# Patient Record
Sex: FEMALE | Race: WHITE | NOT HISPANIC OR LATINO | Employment: UNEMPLOYED | URBAN - METROPOLITAN AREA
[De-identification: names, ages, dates, MRNs, and addresses within clinical notes are randomized per-mention and may not be internally consistent; named-entity substitution may affect disease eponyms.]

---

## 2018-01-07 ENCOUNTER — OFFICE VISIT (OUTPATIENT)
Dept: URGENT CARE | Facility: PHYSICIAN GROUP | Age: 48
End: 2018-01-07
Payer: COMMERCIAL

## 2018-01-07 ENCOUNTER — HOSPITAL ENCOUNTER (OUTPATIENT)
Dept: RADIOLOGY | Facility: MEDICAL CENTER | Age: 48
End: 2018-01-07
Attending: NURSE PRACTITIONER
Payer: COMMERCIAL

## 2018-01-07 VITALS
BODY MASS INDEX: 28.35 KG/M2 | TEMPERATURE: 98.6 F | WEIGHT: 160 LBS | RESPIRATION RATE: 14 BRPM | SYSTOLIC BLOOD PRESSURE: 132 MMHG | DIASTOLIC BLOOD PRESSURE: 84 MMHG | HEIGHT: 63 IN | HEART RATE: 88 BPM | OXYGEN SATURATION: 94 %

## 2018-01-07 DIAGNOSIS — S92.102A CLOSED DISPLACED FRACTURE OF LEFT TALUS, UNSPECIFIED PORTION OF TALUS, INITIAL ENCOUNTER: ICD-10-CM
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
## 2018-01-07 DIAGNOSIS — M79.672 ACUTE PAIN OF LEFT FOOT: ICD-10-CM

## 2018-01-07 DIAGNOSIS — S16.1XXA ACUTE CERVICAL MYOFASCIAL STRAIN, INITIAL ENCOUNTER: ICD-10-CM

## 2018-01-07 DIAGNOSIS — S99.912A INJURY OF LEFT ANKLE, INITIAL ENCOUNTER: ICD-10-CM

## 2018-01-07 DIAGNOSIS — M54.2 ACUTE NECK PAIN: ICD-10-CM

## 2018-01-07 PROCEDURE — 99214 OFFICE O/P EST MOD 30 MIN: CPT | Performed by: NURSE PRACTITIONER

## 2018-01-07 PROCEDURE — 73630 X-RAY EXAM OF FOOT: CPT | Mod: LT

## 2018-01-07 PROCEDURE — 73610 X-RAY EXAM OF ANKLE: CPT | Mod: LT

## 2018-01-07 PROCEDURE — 72040 X-RAY EXAM NECK SPINE 2-3 VW: CPT

## 2018-01-07 ASSESSMENT — ENCOUNTER SYMPTOMS
FOCAL WEAKNESS: 0
SENSORY CHANGE: 0
BACK PAIN: 0
GASTROINTESTINAL NEGATIVE: 1
HEADACHES: 0
CARDIOVASCULAR NEGATIVE: 1
MYALGIAS: 0
NECK PAIN: 1
NEUROLOGICAL NEGATIVE: 1
TINGLING: 0
CONSTITUTIONAL NEGATIVE: 1
PSYCHIATRIC NEGATIVE: 1
FALLS: 1
LOSS OF CONSCIOUSNESS: 0
DIZZINESS: 0
RESPIRATORY NEGATIVE: 1
EYES NEGATIVE: 1

## 2018-01-07 ASSESSMENT — PAIN SCALES - GENERAL: PAINLEVEL: 4=SLIGHT-MODERATE PAIN

## 2018-01-07 NOTE — LETTER
January 7, 2018         Patient: Emilie Lundy   YOB: 1970   Date of Visit: 1/7/2018           To Whom it May Concern:    Emilie Lundy was seen in my clinic on 1/7/2018. She may be excused from work for her next scheduled shifts until evaluated by orthopedics.     If you have any questions or concerns, please don't hesitate to call.        Sincerely,           JUNIOR Olson.  Electronically Signed

## 2018-01-07 NOTE — PROGRESS NOTES
"Subjective:      Emilie Lundy is a 47 y.o. female who presents with Fall (Left foot injury, Poss neck injury, x 1 day)        HPI    The patient presents to urgent care today with complaints of left foot pain and right upper back pain after an accidental fall this morning. States she was walking a dog down a ramp when she accidentally slipped and fell, falling onto her back side. Since then she has had pain to her left foot and right upper back. She denies numbness, tingling, unilateral weakness. She has not tried any medication for symptom relief. She has been using crutches for her foot pain relief. Denies previous injuries to this area.    History reviewed. No pertinent past medical history.  History reviewed. No pertinent surgical history.  Current Outpatient Prescriptions on File Prior to Visit   Medication Sig Dispense Refill   • ibuprofen (MOTRIN) 200 MG Tab Take 200 mg by mouth every 6 hours as needed.       No current facility-administered medications on file prior to visit.      Patient has no known allergies.    Review of Systems   Constitutional: Negative.    HENT: Negative.    Eyes: Negative.    Respiratory: Negative.    Cardiovascular: Negative.    Gastrointestinal: Negative.    Genitourinary: Negative.    Musculoskeletal: Positive for falls, joint pain and neck pain. Negative for back pain and myalgias.   Skin: Negative.    Neurological: Negative.  Negative for dizziness, tingling, sensory change, focal weakness, loss of consciousness and headaches.   Endo/Heme/Allergies: Negative.    Psychiatric/Behavioral: Negative.           Objective:     /84   Pulse 88   Temp 37 °C (98.6 °F)   Resp 14   Ht 1.6 m (5' 3\")   Wt 72.6 kg (160 lb)   LMP 10/07/2017   SpO2 94%   Breastfeeding? No   BMI 28.34 kg/m²      Physical Exam   Constitutional: She is oriented to person, place, and time. Vital signs are normal. She appears well-developed and well-nourished. She is active. She does not have a sickly " appearance. She does not appear ill. No distress.   HENT:   Head: Normocephalic and atraumatic.   Right Ear: External ear normal.   Left Ear: External ear normal.   Nose: Nose normal.   Mouth/Throat: Oropharynx is clear and moist. No oropharyngeal exudate.   Eyes: Conjunctivae are normal. Pupils are equal, round, and reactive to light. Right eye exhibits no discharge. Left eye exhibits no discharge. No scleral icterus.   Neck: Normal range of motion. No JVD present. No tracheal deviation present.   Cardiovascular: Normal rate, regular rhythm, normal heart sounds and intact distal pulses.    Pulmonary/Chest: Effort normal and breath sounds normal. No stridor. No respiratory distress. She exhibits no tenderness.   Musculoskeletal: She exhibits tenderness. She exhibits no edema or deformity.        Left ankle: She exhibits decreased range of motion and swelling. Tenderness. AITFL and CF ligament tenderness found. Achilles tendon normal.        Cervical back: She exhibits tenderness, pain and spasm. She exhibits normal range of motion, no bony tenderness, no swelling, no edema, no deformity, no laceration and normal pulse.        Thoracic back: Normal.        Lumbar back: Normal.        Back:         Left lower leg: Normal.        Left foot: There is decreased range of motion, tenderness, bony tenderness and swelling. There is normal capillary refill, no crepitus, no deformity and no laceration.        Feet:    Lymphadenopathy:     She has no cervical adenopathy.   Neurological: She is alert and oriented to person, place, and time. She has normal strength. No cranial nerve deficit or sensory deficit. She exhibits normal muscle tone. Coordination normal. GCS eye subscore is 4. GCS verbal subscore is 5. GCS motor subscore is 6.   Skin: Skin is warm, dry and intact. Capillary refill takes less than 2 seconds. No abrasion, no bruising, no ecchymosis, no laceration and no rash noted. She is not diaphoretic. No erythema. No  "pallor.   Psychiatric: She has a normal mood and affect. Her behavior is normal. Judgment and thought content normal.   Vitals reviewed.              Assessment/Plan:     1. Fall, initial encounter  DX-FOOT-COMPLETE 3+ LEFT    DX-CERVICAL SPINE-2 OR 3 VIEWS    DX-ANKLE 3+ VIEWS LEFT    REFERRAL TO ORTHOPEDICS   2. Closed displaced fracture of left talus, unspecified portion of talus, initial encounter  DX-ANKLE 3+ VIEWS LEFT    REFERRAL TO ORTHOPEDICS   3. Acute cervical myofascial strain, initial encounter  DX-CERVICAL SPINE-2 OR 3 VIEWS   4. Acute pain of left foot  DX-FOOT-COMPLETE 3+ LEFT    REFERRAL TO ORTHOPEDICS       - DX-FOOT-COMPLETE 3+ LEFT reviewed by myself, radiology reading \"No acute left foot fracture or dislocation.\"    - DX-CERVICAL SPINE-2 OR 3 VIEWS reviewed by myself, radiology reading \"1.  No acute cervical spine fracture or subluxation. 2.  Minor degenerative change with posterior osteophytic spurring at the C5-6 level.\"    -DX-ANKLE 3+ LEFT reviewed by myself, radiology reading \"Avulsion fracture of the dorsal aspect of the distal talus.\"      Cervical x-ray negative, suspect strain. Left foot x-rays negative. Left ankle x-ray shows avulsion fracture of talus. Referral to orthopedics placed. The patient is placed in a walking boot, instructed to be nonweightbearing with crutches. RICE. OTC NSAIDS.  Supportive care, differential diagnoses, and indications for immediate follow-up discussed with patient.   Pathogenesis of diagnosis discussed including typical length and natural progression.   Instructed to return to clinic or nearest emergency department for any change in condition, further concerns, or worsening of symptoms.  Patient states understanding of the plan of care and discharge instructions.          JUNIOR Olson.          "

## 2018-01-09 ENCOUNTER — HOSPITAL ENCOUNTER (OUTPATIENT)
Dept: RADIOLOGY | Facility: MEDICAL CENTER | Age: 48
End: 2018-01-09
Attending: PHYSICIAN ASSISTANT
Payer: COMMERCIAL

## 2018-01-09 DIAGNOSIS — M25.572 LEFT ANKLE PAIN, UNSPECIFIED CHRONICITY: ICD-10-CM

## 2018-01-09 PROCEDURE — 73700 CT LOWER EXTREMITY W/O DYE: CPT | Mod: LT

## 2018-09-06 ENCOUNTER — APPOINTMENT (RX ONLY)
Dept: URBAN - METROPOLITAN AREA CLINIC 4 | Facility: CLINIC | Age: 48
Setting detail: DERMATOLOGY
End: 2018-09-06

## 2018-09-06 DIAGNOSIS — L81.4 OTHER MELANIN HYPERPIGMENTATION: ICD-10-CM

## 2018-09-06 DIAGNOSIS — Z71.89 OTHER SPECIFIED COUNSELING: ICD-10-CM

## 2018-09-06 DIAGNOSIS — L20.89 OTHER ATOPIC DERMATITIS: ICD-10-CM

## 2018-09-06 DIAGNOSIS — D22 MELANOCYTIC NEVI: ICD-10-CM

## 2018-09-06 PROBLEM — L20.84 INTRINSIC (ALLERGIC) ECZEMA: Status: ACTIVE | Noted: 2018-09-06

## 2018-09-06 PROBLEM — D22.5 MELANOCYTIC NEVI OF TRUNK: Status: ACTIVE | Noted: 2018-09-06

## 2018-09-06 PROCEDURE — ? TREATMENT REGIMEN

## 2018-09-06 PROCEDURE — ? BLEACH BATH INSTRUCTIONS

## 2018-09-06 PROCEDURE — 99203 OFFICE O/P NEW LOW 30 MIN: CPT

## 2018-09-06 PROCEDURE — ? COUNSELING

## 2018-09-06 PROCEDURE — ? PRESCRIPTION

## 2018-09-06 RX ORDER — DESONIDE 0.5 MG/G
CREAM TOPICAL
Qty: 1 | Refills: 1 | Status: ERX | COMMUNITY
Start: 2018-09-06

## 2018-09-06 RX ORDER — TRIAMCINOLONE ACETONIDE 1 MG/G
CREAM TOPICAL BID
Qty: 1 | Refills: 1 | Status: ERX | COMMUNITY
Start: 2018-09-06

## 2018-09-06 RX ADMIN — TRIAMCINOLONE ACETONIDE: 1 CREAM TOPICAL at 20:37

## 2018-09-06 RX ADMIN — DESONIDE: 0.5 CREAM TOPICAL at 20:37

## 2018-09-06 ASSESSMENT — LOCATION SIMPLE DESCRIPTION DERM
LOCATION SIMPLE: UPPER BACK
LOCATION SIMPLE: RIGHT UPPER ARM
LOCATION SIMPLE: LEFT CHEEK
LOCATION SIMPLE: LEFT UPPER ARM

## 2018-09-06 ASSESSMENT — LOCATION DETAILED DESCRIPTION DERM
LOCATION DETAILED: INFERIOR THORACIC SPINE
LOCATION DETAILED: RIGHT ANTECUBITAL SKIN
LOCATION DETAILED: LEFT CENTRAL MALAR CHEEK
LOCATION DETAILED: LEFT ANTECUBITAL SKIN

## 2018-09-06 ASSESSMENT — LOCATION ZONE DERM
LOCATION ZONE: TRUNK
LOCATION ZONE: ARM
LOCATION ZONE: FACE

## 2018-09-06 NOTE — PROCEDURE: BLEACH BATH INSTRUCTIONS
[facility]: I recommended weekly bleach baths. This is accomplished by filling a bathtub with warm to hot water and then adding in 1/4 cup of bleach. The patient should then soak for 15-20 minutes and then rinse off.
Detail Level: Zone

## 2019-09-06 ENCOUNTER — OFFICE VISIT (OUTPATIENT)
Dept: URGENT CARE | Facility: PHYSICIAN GROUP | Age: 49
End: 2019-09-06
Payer: COMMERCIAL

## 2019-09-06 ENCOUNTER — HOSPITAL ENCOUNTER (OUTPATIENT)
Dept: LAB | Facility: MEDICAL CENTER | Age: 49
End: 2019-09-06
Attending: FAMILY MEDICINE
Payer: COMMERCIAL

## 2019-09-06 VITALS
TEMPERATURE: 97.7 F | DIASTOLIC BLOOD PRESSURE: 76 MMHG | SYSTOLIC BLOOD PRESSURE: 98 MMHG | HEART RATE: 57 BPM | RESPIRATION RATE: 12 BRPM | OXYGEN SATURATION: 95 % | HEIGHT: 63 IN | BODY MASS INDEX: 28.7 KG/M2 | WEIGHT: 162 LBS

## 2019-09-06 DIAGNOSIS — R10.11 RUQ PAIN: ICD-10-CM

## 2019-09-06 DIAGNOSIS — K80.20 CALCULUS OF GALLBLADDER WITHOUT CHOLECYSTITIS WITHOUT OBSTRUCTION: ICD-10-CM

## 2019-09-06 LAB
ALBUMIN SERPL BCP-MCNC: 4.3 G/DL (ref 3.2–4.9)
ALBUMIN/GLOB SERPL: 1.7 G/DL
ALP SERPL-CCNC: 63 U/L (ref 30–99)
ALT SERPL-CCNC: 15 U/L (ref 2–50)
ANION GAP SERPL CALC-SCNC: 7 MMOL/L (ref 0–11.9)
AST SERPL-CCNC: 16 U/L (ref 12–45)
BASOPHILS # BLD AUTO: 1.2 % (ref 0–1.8)
BASOPHILS # BLD: 0.08 K/UL (ref 0–0.12)
BILIRUB SERPL-MCNC: 0.7 MG/DL (ref 0.1–1.5)
BUN SERPL-MCNC: 13 MG/DL (ref 8–22)
CALCIUM SERPL-MCNC: 9.4 MG/DL (ref 8.5–10.5)
CHLORIDE SERPL-SCNC: 103 MMOL/L (ref 96–112)
CO2 SERPL-SCNC: 28 MMOL/L (ref 20–33)
CREAT SERPL-MCNC: 0.81 MG/DL (ref 0.5–1.4)
EOSINOPHIL # BLD AUTO: 0.11 K/UL (ref 0–0.51)
EOSINOPHIL NFR BLD: 1.6 % (ref 0–6.9)
ERYTHROCYTE [DISTWIDTH] IN BLOOD BY AUTOMATED COUNT: 41.7 FL (ref 35.9–50)
GLOBULIN SER CALC-MCNC: 2.5 G/DL (ref 1.9–3.5)
GLUCOSE SERPL-MCNC: 96 MG/DL (ref 65–99)
HCT VFR BLD AUTO: 44.1 % (ref 37–47)
HGB BLD-MCNC: 14.7 G/DL (ref 12–16)
IMM GRANULOCYTES # BLD AUTO: 0.01 K/UL (ref 0–0.11)
IMM GRANULOCYTES NFR BLD AUTO: 0.1 % (ref 0–0.9)
LIPASE SERPL-CCNC: 11 U/L (ref 11–82)
LYMPHOCYTES # BLD AUTO: 2.92 K/UL (ref 1–4.8)
LYMPHOCYTES NFR BLD: 42.1 % (ref 22–41)
MCH RBC QN AUTO: 32.4 PG (ref 27–33)
MCHC RBC AUTO-ENTMCNC: 33.3 G/DL (ref 33.6–35)
MCV RBC AUTO: 97.1 FL (ref 81.4–97.8)
MONOCYTES # BLD AUTO: 0.38 K/UL (ref 0–0.85)
MONOCYTES NFR BLD AUTO: 5.5 % (ref 0–13.4)
NEUTROPHILS # BLD AUTO: 3.44 K/UL (ref 2–7.15)
NEUTROPHILS NFR BLD: 49.5 % (ref 44–72)
NRBC # BLD AUTO: 0 K/UL
NRBC BLD-RTO: 0 /100 WBC
PLATELET # BLD AUTO: 236 K/UL (ref 164–446)
PMV BLD AUTO: 11.8 FL (ref 9–12.9)
POTASSIUM SERPL-SCNC: 4 MMOL/L (ref 3.6–5.5)
PROT SERPL-MCNC: 6.8 G/DL (ref 6–8.2)
RBC # BLD AUTO: 4.54 M/UL (ref 4.2–5.4)
SODIUM SERPL-SCNC: 138 MMOL/L (ref 135–145)
WBC # BLD AUTO: 6.9 K/UL (ref 4.8–10.8)

## 2019-09-06 PROCEDURE — 83690 ASSAY OF LIPASE: CPT

## 2019-09-06 PROCEDURE — 99214 OFFICE O/P EST MOD 30 MIN: CPT | Performed by: FAMILY MEDICINE

## 2019-09-06 PROCEDURE — 80053 COMPREHEN METABOLIC PANEL: CPT

## 2019-09-06 PROCEDURE — 85025 COMPLETE CBC W/AUTO DIFF WBC: CPT

## 2019-09-06 PROCEDURE — 36415 COLL VENOUS BLD VENIPUNCTURE: CPT

## 2019-09-06 ASSESSMENT — ENCOUNTER SYMPTOMS
BLOOD IN STOOL: 0
EYE DISCHARGE: 0
WEIGHT LOSS: 0
VOMITING: 0
EYE REDNESS: 0
NAUSEA: 0
MYALGIAS: 0
DIARRHEA: 0

## 2019-09-06 NOTE — PROGRESS NOTES
"Subjective:      Emilie Lundy is a 49 y.o. female who presents with Rib Pain (R side rib pain, worsens from eating/drinking x1week)            1 week RUQ abdominal pain with eating.  Particularly coffee and fatty foods.  Waxes and wanes, currently mild without radiation.  Not positional or pleuritic. No fever. No jaundice. No SOB. No trauma.  No home remedies tried.  No other aggravating or alleviating factors.      Review of Systems   Constitutional: Negative for malaise/fatigue and weight loss.   Eyes: Negative for discharge and redness.   Cardiovascular: Negative for leg swelling.   Gastrointestinal: Negative for blood in stool, diarrhea, melena, nausea and vomiting.   Musculoskeletal: Negative for joint pain and myalgias.   Skin: Negative for itching and rash.       .  Medications, Allergies, and current problem list reviewed today in [facility]  PMH:  has no past medical history of ASTHMA or Diabetes.  MEDS:   Current Outpatient Medications:   •  ibuprofen (MOTRIN) 200 MG Tab, Take 200 mg by mouth every 6 hours as needed., Disp: , Rfl:   ALLERGIES: No Known Allergies  SURGHX: History reviewed. No pertinent surgical history.  SOCHX:  reports that she has never smoked. She has never used smokeless tobacco.  FH: Reviewed with patient no gallbladder issues.  No other pertinent     Objective:     BP (!) 98/76 (BP Location: Right arm, Patient Position: Sitting, BP Cuff Size: Large adult)   Pulse (!) 57   Temp 36.5 °C (97.7 °F) (Temporal)   Resp 12   Ht 1.6 m (5' 3\")   Wt 73.5 kg (162 lb)   SpO2 95%   BMI 28.70 kg/m²      Physical Exam   Constitutional: She is oriented to person, place, and time. She appears well-developed and well-nourished. No distress.   HENT:   Head: Normocephalic and atraumatic.   Eyes: Conjunctivae are normal.   Cardiovascular: Normal rate, regular rhythm and normal heart sounds.   No murmur heard.  Pulmonary/Chest: Effort normal and breath sounds normal. She has no wheezes.   Abdominal: Soft. " She exhibits no mass. There is tenderness ( Mild RUQ). There is no rebound and no guarding.   Neurological: She is alert and oriented to person, place, and time.   Skin: Skin is warm and dry. No rash noted.               Assessment/Plan:     1. RUQ pain  CBC WITH DIFFERENTIAL    Comp Metabolic Panel    LIPASE    US-RUQ     Differential diagnosis, natural history, supportive care, and indications for immediate follow-up discussed at length.     Low-fat diet:     Will follow-up studies

## 2019-09-10 ENCOUNTER — HOSPITAL ENCOUNTER (OUTPATIENT)
Dept: RADIOLOGY | Facility: MEDICAL CENTER | Age: 49
End: 2019-09-10
Attending: FAMILY MEDICINE
Payer: COMMERCIAL

## 2019-09-10 DIAGNOSIS — R10.11 RUQ PAIN: ICD-10-CM

## 2019-09-10 PROCEDURE — 76705 ECHO EXAM OF ABDOMEN: CPT

## 2019-09-10 NOTE — PROGRESS NOTES
Attempted to call with results.  Message that voicemail is full.  Sent message through my chart.  Referral for general surgery placed.

## 2020-05-21 ENCOUNTER — HOSPITAL ENCOUNTER (OUTPATIENT)
Facility: MEDICAL CENTER | Age: 50
End: 2020-05-21
Payer: COMMERCIAL

## 2020-05-28 LAB
SARS-COV-2 RNA SPEC QL NAA+PROBE: NOT DETECTED
SPECIMEN SOURCE: NORMAL

## 2020-12-28 ENCOUNTER — OFFICE VISIT (OUTPATIENT)
Dept: URGENT CARE | Facility: PHYSICIAN GROUP | Age: 50
End: 2020-12-28
Payer: OTHER MISCELLANEOUS

## 2020-12-28 VITALS
HEART RATE: 66 BPM | WEIGHT: 179.2 LBS | SYSTOLIC BLOOD PRESSURE: 110 MMHG | DIASTOLIC BLOOD PRESSURE: 70 MMHG | BODY MASS INDEX: 31.75 KG/M2 | OXYGEN SATURATION: 97 % | HEIGHT: 63 IN | RESPIRATION RATE: 12 BRPM | TEMPERATURE: 98.6 F

## 2020-12-28 DIAGNOSIS — H15.002 SCLERITIS AND EPISCLERITIS OF LEFT EYE: ICD-10-CM

## 2020-12-28 DIAGNOSIS — H15.102 SCLERITIS AND EPISCLERITIS OF LEFT EYE: ICD-10-CM

## 2020-12-28 PROCEDURE — 99214 OFFICE O/P EST MOD 30 MIN: CPT | Performed by: NURSE PRACTITIONER

## 2020-12-28 RX ORDER — TOBRAMYCIN AND DEXAMETHASONE 3; 1 MG/ML; MG/ML
1 SUSPENSION/ DROPS OPHTHALMIC
Qty: 10 ML | Refills: 0 | Status: SHIPPED | OUTPATIENT
Start: 2020-12-28 | End: 2022-04-16

## 2020-12-28 ASSESSMENT — ENCOUNTER SYMPTOMS
FEVER: 0
VOMITING: 0
EYE DISCHARGE: 0
PHOTOPHOBIA: 0
DOUBLE VISION: 0
HEADACHES: 0
EYE REDNESS: 1
NAUSEA: 0
DIZZINESS: 0
CHILLS: 0
ABDOMINAL PAIN: 0
EYE PAIN: 1
BLURRED VISION: 0

## 2020-12-28 ASSESSMENT — FIBROSIS 4 INDEX: FIB4 SCORE: 0.89

## 2020-12-28 ASSESSMENT — VISUAL ACUITY: OU: 1

## 2020-12-28 NOTE — PROGRESS NOTES
"Subjective:   Emilie Lundy is a 50 y.o. female who presents for Eye Problem (L eye discomfort, pt was feeding horses and hay possibly fell in eye, x3 days )      HPI  50-year-old female patient in urgent care for new onset left eye pain with foreign body sensation sustained 3 days ago while she was feeding her horses.  Patient states she feels like there is pain there as it was very windy and she was feeling them.  Patient denies dizziness, nausea, vomiting, changes in vision, fogginess, dizziness, headache.  She has rinsed the eye out and has not taken anything over-the-counter for her symptoms.    Review of Systems   Constitutional: Negative for chills, fever and malaise/fatigue.   Eyes: Positive for pain and redness. Negative for blurred vision, double vision, photophobia and discharge.   Gastrointestinal: Negative for abdominal pain, nausea and vomiting.   Neurological: Negative for dizziness and headaches.       There is no problem list on file for this patient.    History reviewed. No pertinent surgical history.  Social History     Tobacco Use   • Smoking status: Never Smoker   • Smokeless tobacco: Never Used   Substance Use Topics   • Alcohol use: Yes   • Drug use: Not on file      Family History   Problem Relation Age of Onset   • Non-contributory Mother    • Non-contributory Father       (Allergies, Medications, & Tobacco/Substance Use were reconciled by the Medical Assistant and reviewed by myself. The family history is prepopulated)     Objective:     /70 (BP Location: Left arm, Patient Position: Sitting, BP Cuff Size: Adult)   Pulse 66   Temp 37 °C (98.6 °F) (Temporal)   Resp 12   Ht 1.6 m (5' 3\")   Wt 81.3 kg (179 lb 3.2 oz)   SpO2 97%   BMI 31.74 kg/m²     Physical Exam  Vitals signs reviewed.   Constitutional:       Appearance: Normal appearance.   HENT:      Mouth/Throat:      Mouth: Mucous membranes are moist.   Eyes:      General: Lids are normal. Lids are everted, no foreign bodies " appreciated. Vision grossly intact. Gaze aligned appropriately. No allergic shiner, visual field deficit or scleral icterus.        Right eye: No discharge.      Extraocular Movements: Extraocular movements intact.      Right eye: Normal extraocular motion and no nystagmus.      Left eye: Normal extraocular motion and no nystagmus.      Conjunctiva/sclera:      Right eye: Right conjunctiva is not injected. No chemosis, exudate or hemorrhage.     Left eye: Left conjunctiva is injected. No chemosis, exudate or hemorrhage.     Pupils: Pupils are equal, round, and reactive to light.      Comments: Fluorescein eye stain performed.  No uptake noted, no foreign body or corneal abrasions noted   Cardiovascular:      Rate and Rhythm: Normal rate and regular rhythm.      Heart sounds: Normal heart sounds.   Pulmonary:      Effort: Pulmonary effort is normal.      Breath sounds: Normal breath sounds.   Skin:     General: Skin is warm.   Neurological:      General: No focal deficit present.      Mental Status: She is alert and oriented to person, place, and time.   Psychiatric:         Mood and Affect: Mood normal.         Behavior: Behavior normal.         Thought Content: Thought content normal.         Judgment: Judgment normal.         Assessment/Plan:     1. Scleritis and episcleritis of left eye  tobramycin-dexamethasone (TOBRADEX) 0.3-0.1 % Suspension     Discussed physical examination findings as well as patient presentation, length of patient symptoms and possible mechanism of injury is consistent with scleritis.  Patient could have had irritation due to dust and foreign body but none are appreciated today.  Will provide patient with tobradex I discussed abortive care including over-the-counter NSAIDs, Tylenol, warm water compresses and lubricating eyedrop suggestions OTC and provided patient with contact information for [facility] for further work-up and evaluation, if needed.    Differential  diagnosis, natural history, supportive care, and indications for immediate follow-up discussed.    Advised the patient to follow-up with the primary care physician for recheck, reevaluation, and consideration of further management.    Please note that this dictation was created using voice recognition software. I have made a reasonable attempt to correct obvious errors, but I expect that there are errors of grammar and possibly content that I did not discover before finalizing the note.    This note was electronically signed TANA Salmon

## 2022-04-02 ENCOUNTER — APPOINTMENT (OUTPATIENT)
Dept: RADIOLOGY | Facility: IMAGING CENTER | Age: 52
End: 2022-04-02
Attending: STUDENT IN AN ORGANIZED HEALTH CARE EDUCATION/TRAINING PROGRAM
Payer: OTHER MISCELLANEOUS

## 2022-04-02 ENCOUNTER — OFFICE VISIT (OUTPATIENT)
Dept: URGENT CARE | Facility: PHYSICIAN GROUP | Age: 52
End: 2022-04-02
Payer: OTHER MISCELLANEOUS

## 2022-04-02 VITALS
HEART RATE: 90 BPM | DIASTOLIC BLOOD PRESSURE: 80 MMHG | RESPIRATION RATE: 14 BRPM | TEMPERATURE: 98.2 F | OXYGEN SATURATION: 94 % | SYSTOLIC BLOOD PRESSURE: 124 MMHG

## 2022-04-02 DIAGNOSIS — S69.91XA RIGHT WRIST INJURY, INITIAL ENCOUNTER: ICD-10-CM

## 2022-04-02 DIAGNOSIS — S62.101A WRIST FRACTURE, CLOSED, RIGHT, INITIAL ENCOUNTER: ICD-10-CM

## 2022-04-02 PROCEDURE — 99213 OFFICE O/P EST LOW 20 MIN: CPT | Performed by: STUDENT IN AN ORGANIZED HEALTH CARE EDUCATION/TRAINING PROGRAM

## 2022-04-02 PROCEDURE — 73110 X-RAY EXAM OF WRIST: CPT | Mod: TC,RT | Performed by: RADIOLOGY

## 2022-04-03 NOTE — PROGRESS NOTES
Subjective:   Emilie Lundy is a 51 y.o. female who presents for Wrist Pain (Fell out of a van.  Right wrist pain.)      HPI:  Pleasant 51-year-old female presents the clinic for atraumatic right wrist injury that occurred 1 hour ago.  Patient states that she was stepping out of her van and missed the side step and fell forward on her right wrist.  Patient states that she immediately started having pain and swelling in the right wrist and came to urgent care right away.  Patient denies fever, chills, laceration, rash, numbness, tingling, burning, chest pain, shortness of breath, nausea, vomiting, abdominal pain, dizziness, headache, loss of range of motion, head or neck injury, loss of consciousness, or weakness.      Medications:    • tobramycin-dexamethasone Susp    Allergies: Patient has no known allergies.    Problem List: Emilie Lundy does not have a problem list on file.    Surgical History:  No past surgical history on file.    Past Social Hx: Emilie Lundy  reports that she has never smoked. She has never used smokeless tobacco. She reports current alcohol use.     Past Family Hx:  Emilie Lundy family history includes Non-contributory in her father and mother.     Problem list, medications, and allergies reviewed by myself today in Epic.     Objective:     /80   Pulse 90   Temp 36.8 °C (98.2 °F) (Temporal)   Resp 14   SpO2 94%     Physical Exam  Vitals reviewed.   Constitutional:       General: She is not in acute distress.     Appearance: Normal appearance.   HENT:      Mouth/Throat:      Mouth: Mucous membranes are moist.   Eyes:      Pupils: Pupils are equal, round, and reactive to light.   Cardiovascular:      Rate and Rhythm: Normal rate and regular rhythm.      Pulses: Normal pulses.      Heart sounds: Normal heart sounds. No murmur heard.  Pulmonary:      Effort: Pulmonary effort is normal. No respiratory distress.      Breath sounds: Normal breath sounds. No stridor. No wheezing, rhonchi or  rales.   Musculoskeletal:      Right wrist: Swelling, tenderness and bony tenderness present. No deformity, effusion, lacerations, snuff box tenderness or crepitus. Decreased range of motion. Normal pulse.      Left wrist: Normal.   Skin:     General: Skin is warm and dry.      Capillary Refill: Capillary refill takes less than 2 seconds.      Findings: No erythema, lesion or rash.   Neurological:      General: No focal deficit present.      Mental Status: She is alert and oriented to person, place, and time.       RADIOLOGY RESULTS   DX-WRIST-COMPLETE 3+ RIGHT    Result Date: 4/2/2022 4/2/2022 4:51 PM HISTORY/REASON FOR EXAM:  Pain/Deformity Following Trauma. TECHNIQUE/EXAM DESCRIPTION AND NUMBER OF VIEWS:  4 views of the RIGHT hand/wrist. COMPARISON: None FINDINGS: There is an impacted, mildly displaced intra-articular distal radius fracture. The distal ulna appears intact. The carpal bones appear intact.     Impacted intra-articular distal radius fracture.            Assessment/Plan:     Diagnosis and associated orders:     1. Right wrist injury, initial encounter  DX-WRIST-COMPLETE 3+ RIGHT    Referral to Sports Medicine   2. Wrist fracture, closed, right, initial encounter  Referral to Sports Medicine      Comments/MDM:     • Patient presents for evaluation following traumatic injury of the right wrist that occurred 1 hour ago.  Patient denies any numbness, tingling, or burning.  No focal neurological deficits.  • X-ray of the right wrist shows that there is an impacted, mildly displaced intra-articular distal radius fracture with no involvement of the ulna or carpal bones.  • Patient was placed in a Colles' splint was advised to not use her wrist until evaluated by orthopedics.  Was very agreeable to this.  • Patient use ibuprofen/Tylenol every 4-6 hours, ice 5 times a day, elevate the wrist as often as possible.  • Patient was educated on the signs and symptoms that warrant immediate reevaluation.          Differential diagnosis, natural history, supportive care, and indications for immediate follow-up discussed.    Advised the patient to follow-up with the primary care physician for recheck, reevaluation, and consideration of further management.    Please note that this dictation was created using voice recognition software. I have made a reasonable attempt to correct obvious errors, but I expect that there are errors of grammar and possibly content that I did not discover before finalizing the note.    Electronically signed by Julian Canada PA-C.

## 2022-04-14 PROBLEM — S52.501A FRACTURE OF DISTAL RADIUS AND ULNA, RIGHT, CLOSED, INITIAL ENCOUNTER: Status: ACTIVE | Noted: 2022-04-14

## 2022-04-14 PROBLEM — S52.601A FRACTURE OF DISTAL RADIUS AND ULNA, RIGHT, CLOSED, INITIAL ENCOUNTER: Status: ACTIVE | Noted: 2022-04-14

## 2022-04-16 ENCOUNTER — OCCUPATIONAL MEDICINE (OUTPATIENT)
Dept: URGENT CARE | Facility: PHYSICIAN GROUP | Age: 52
End: 2022-04-16
Payer: COMMERCIAL

## 2022-04-16 VITALS
SYSTOLIC BLOOD PRESSURE: 110 MMHG | DIASTOLIC BLOOD PRESSURE: 74 MMHG | OXYGEN SATURATION: 97 % | HEART RATE: 82 BPM | RESPIRATION RATE: 16 BRPM | WEIGHT: 172 LBS | TEMPERATURE: 99 F | HEIGHT: 63 IN | BODY MASS INDEX: 30.48 KG/M2

## 2022-04-16 DIAGNOSIS — S62.101D CLOSED FRACTURE OF RIGHT WRIST WITH ROUTINE HEALING, SUBSEQUENT ENCOUNTER: Primary | ICD-10-CM

## 2022-04-16 DIAGNOSIS — S69.91XD RIGHT WRIST INJURY, SUBSEQUENT ENCOUNTER: ICD-10-CM

## 2022-04-16 PROCEDURE — 99214 OFFICE O/P EST MOD 30 MIN: CPT | Performed by: PHYSICIAN ASSISTANT

## 2022-04-16 NOTE — LETTER
"EMPLOYEE’S CLAIM FOR COMPENSATION/ REPORT OF INITIAL TREATMENT  FORM C-4    EMPLOYEE’S CLAIM - PROVIDE ALL INFORMATION REQUESTED   First Name  Emilie Last Name  Kamron Birthdate                    1970                Sex  female Claim Number (Insurer’s Use Only)    Home Address  [place] Age  51 y.o. Height  1.6 m (5' 3\") Weight  78 kg (172 lb) [facility]     [facility] Zip  [address] Telephone  There are no phone numbers on file.   Mailing Address  [address] Zip  [address] Primary Language Spoken  English    Insurer   Third-Party       Employee's Occupation (Job Title) When Injury or Occupational Disease Occurred      Employer's Name/Company Name     Telephone  [phone]    Office Mail Address (Number and Street)   [address] [place] [address]  Zip  [address]    Date of Injury  4/2/2022               Hours Injury  3:30 PM Date Employer Notified  4/2/2022 Last Day of Work after Injury     or Occupational Disease  4/15/2022 Supervisor to Whom Injury     Reported  Shorty Lundy   Address or Location of Accident (if applicable)  Work [1]   What were you doing at the time of accident? (if applicable)  Restocking supplies in van    How did this injury or occupational disease occur? (Be specific an answer in detail. Use additional sheet if necessary)  I was restocking work van when I went to step out of van I missed the step and fell on to my right side.   If you believe that you have an occupational disease, when did you first have knowledge of the disability and it relationship to your employment?  n/a Witnesses to the Accident  n/a      Nature of Injury or Occupational Disease  Fracture  Part(s) of Body Injured or Affected  Lower Arm (R), ,     I certify that the above is true and correct to the best of my knowledge and that I have provided this information in " order to obtain the benefits of [place]  I hereby authorize any physician, chiropractor, surgeon, practitioner, or other person, any hospital, including [facility] or [facility] hospital, any medical service organization, any insurance company, or other institution or organization to release to each other, any medical or other information, including benefits paid or payable, pertinent to this injury or disease, except information relative to diagnosis, treatment and/or counseling for AIDS, psychological conditions, alcohol or controlled substances, for which I must give specific authorization.  A Photostat of this authorization shall be as valid as the original.     Date   Place Employee’s Original or  *Electronic Signature   THIS REPORT MUST BE COMPLETED AND MAILED WITHIN 3 WORKING DAYS OF TREATMENT   Place  [facility]  Name of Facility  [facility]   Date  4/16/2022 Diagnosis and Description of Injury or Occupational Disease  (S62.101D) Closed fracture of right wrist with routine healing, subsequent encounter  (primary encounter diagnosis)  (S69.91XD) Right wrist injury, subsequent encounter Is there evidence the injured employee was under the influence of alcohol and/or another controlled substance at the time of accident?  ? No ? Yes (if yes, please explain)    Hour  4:31 PM   The primary encounter diagnosis was Closed fracture of right wrist with routine healing, subsequent encounter. A diagnosis of Right wrist injury, subsequent encounter was also pertinent to this visit. No   Treatment  Limited use of RUE. OTC NSAIDS TID FOR PAIN AND SWELLING. TRANSFER CARE TO ORTHO  Have you advised the patient to remain off work five days or     more?    X-Ray Findings  Positive  Comments:distal radius fracture   ? Yes Indicate dates:   From   To      From information given by the  "employee, together with medical evidence, can        you directly connect this injury or occupational disease as job incurred?  Yes ? No If no, is the injured employee capable of:  ? full duty  No ? modified duty  Yes   Is additional medical care by a physician indicated?  Yes If Modified Duty, Specify any Limitations / Restrictions  Limited use of RUE.   Do you know of any previous injury or disease contributing to this condition or occupational disease?  ? Yes ? No (Explain if yes)                          No   Date  4/16/2022 Print Health Care Provider's   Breezy Rivas P.A.-C. I certify the employer’s copy of  this form was mailed on:   Address  [address] [place] [address]. [address] Insurer’s Use Only     [facility] Zip  [address]    Provider’s Tax ID Number  [id number] Telephone  Dept: [phone]             Health Care Provider’s Original or Electronic Signature  e-SignSBREEZY GROSS P.A.-C. Degree (MD,DO, DC,PAJohannaC,APRN)   PAJANAK      * Complete and attach Release of Information (Form C-4A) when injured employee signs C-4 Form electronically  ORIGINAL - TREATING HEALTHCARE PROVIDER PAGE 2 - INSURER/TPA PAGE 3 - EMPLOYER PAGE 4 - EMPLOYEE             Form C-4 (rev.08/21)           BRIEF DESCRIPTION OF RIGHTS AND BENEFITS  (Pursuant to [place] 616C.050)    Notice of Injury or Occupational Disease (Incident Report Form C-1): If an injury or occupational disease (OD) arises out of and in the course of employment, you must provide written notice to your employer as soon as practicable, but no later than 7 days after the accident or OD. Your employer shall maintain a sufficient supply of the required forms.    Claim for Compensation (Form C-4): If medical treatment is sought, the form C-4 is available at the place of initial treatment. A completed \"Claim for Compensation\" (Form C-4) must be filed within 90 days after an accident or OD. The treating physician or chiropractor must, within 3 working days after treatment, " complete and mail to the employer, the employer's insurer and third-party , the Claim for Compensation.    Medical Treatment: If you require medical treatment for your on-the-job injury or OD, you may be required to select a physician or chiropractor from a list provided by your workers’ compensation insurer, if it has contracted with an Organization for Managed Care (MCO) or Preferred Provider Organization (PPO) or providers of health care. If your employer has not entered into a contract with an MCO or PPO, you may select a physician or chiropractor from the Panel of Physicians and Chiropractors. Any medical costs related to your industrial injury or OD will be paid by your insurer.    Temporary Total Disability (TTD): If your doctor has certified that you are unable to work for a period of at least 5 consecutive days, or 5 cumulative days in a 20-day period, or places restrictions on you that your employer does not accommodate, you may be entitled to TTD compensation.    Temporary Partial Disability (TPD): If the wage you receive upon reemployment is less than the compensation for TTD to which you are entitled, the insurer may be required to pay you TPD compensation to make up the difference. TPD can only be paid for a maximum of 24 months.    Permanent Partial Disability (PPD): When your medical condition is stable and there is an indication of a PPD as a result of your injury or OD, within 30 days, your insurer must arrange for an evaluation by a rating physician or chiropractor to determine the degree of your PPD. The amount of your PPD award depends on the date of injury, the results of the PPD evaluation, your age and wage.    Permanent Total Disability (PTD): If you are medically certified by a treating physician or chiropractor as permanently and totally disabled and have been granted a PTD status by your insurer, you are entitled to receive monthly benefits not to exceed 66 2/3% of your  average monthly wage. The amount of your PTD payments is subject to reduction if you previously received a lump-sum PPD award.    Vocational Rehabilitation Services: You may be eligible for vocational rehabilitation services if you are unable to return to the job due to a permanent physical impairment or permanent restrictions as a result of your injury or occupational disease.    Transportation and Per Mj Reimbursement: You may be eligible for travel expenses and per mj associated with medical treatment.    Reopening: You may be able to reopen your claim if your condition worsens after claim closure.     Appeal Process: If you disagree with a written determination issued by the insurer or the insurer does not respond to your request, you may appeal to the Department of Administration, , by following the instructions contained in your determination letter. You must appeal the determination within 70 days from the date of the determination letter at [address], [address], [place], [place] [address], or [address], [address], [place] [address]. If you disagree with the  decision, you may appeal to the Department of Administration, . You must file your appeal within 30 days from the date of the  decision letter at [address], [address], [place], [place] [address], or [address] [facility], [address], [place] [address]. If you disagree with a decision of an , you may file a petition for judicial review with the District Court. You must do so within 30 days of the Appeal Officer’s decision. You may be represented by an  at your own expense or you may contact the [facility] for possible representation.    [place]  for Injured Workers (NAIW): If you disagree with a  decision, you may request that NAIW represent you without charge at an  Hearing. For information  regarding denial of benefits, you may contact the [facility] at: [address], [address], [place] [address], (778) 668-2089, or [address] [facility] [address] [facility], [address], [place] [address], [phone]) [phone]    To File a Complaint with the Division: If you wish to file a complaint with the  of the Division of Industrial Relations (DIR),  please contact the Workers’ Compensation Section, [address], [address], [place], [place] [address], telephone (494) [phone], or [address], [address], [place], [place] [address], telephone (987) [phone].    For assistance with Workers’ Compensation Issues: You may contact the [facility] Office for Consumer Health Assistance, [address], [address], [place], [place] [address], Toll Free 1-422.908.5355, Web site: http://[facility].[place].gov/Programs/CHERRY E-mail: cherry@[facility].nv.[facility]              __________________________________________________________________                                    _________________            Employee Name / Signature                                                                                                                            Date                                                                                                                                                                                                                              D-2 (rev. 10/20)

## 2022-04-16 NOTE — LETTER
[facility]  [address] - [place] [address]  Phone:  [phone] - Fax:  [phone]   [facility] Progress Report and Disability Certification  Date of Service: 4/16/2022   No Show:  No  Date / Time of Next Visit: 4/30/2022   Claim Information   Patient Name: Emilie Lundy  Claim Number:     Employer:   [employer] Date of Injury: 4/2/2022     Insurer / TPA:    ID / SSN:     Occupation:   Diagnosis: The primary encounter diagnosis was Closed fracture of right wrist with routine healing, subsequent encounter. A diagnosis of Right wrist injury, subsequent encounter was also pertinent to this visit.    Medical Information   Related to Industrial Injury? Yes    Subjective Complaints:  Employer's Name:       Patient presents with:  Work-Related Injury: Seen on 4/2/22 already.  Needs a C4 because she fell out of her van.       The following text has been copied and pasted from visit 4/2/2022:   Patient states that she was stepping out of her van and missed the side step and fell forward on her right wrist.  Patient states that she immediately started having pain and swelling in the right wrist and came to urgent care right away.  Patient denies fever, chills, laceration, rash, numbness, tingling, burning, chest pain, shortness of breath, nausea, vomiting, abdominal pain, dizziness, headache, loss of range of motion, head or neck injury, loss of consciousness, or weakness.    PT was diagnosed at that time with distal radius fracture, referred to orthopedics.  Pt has been seen by orthopedics, they placed her in a cast.     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  Restocking supplies in van               Location of Injury:  Fracture, Lower Arm (R)          Objective Findings: Pt exam: pt is in short forearm cast.   Fingers are normal in color, temperature without swelling. Distal neuro/vascular intact.  Cap refill < 2 sec     Pre-Existing Condition(s):     Assessment:   Initial Visit    Status: Discharged / Care Transfer  Permanent Disability:No    Plan:      Diagnostics: X-ray  Comments:distal radius fracture    Comments:       Disability Information   Status: Released to Restricted Duty    From:  4/16/2022  Through: 4/30/2022 Restrictions are: Temporary   Physical Restrictions   Sitting:    Standing:    Stooping:    Bending:      Squatting:    Walking:    Climbing:    Pushing:      Pulling:    Other:    Reaching Above Shoulder (L):   Reaching Above Shoulder (R):       Reaching Below Shoulder (L):    Reaching Below Shoulder (R):      Not to exceed Weight Limits   Carrying(hrs):   Weight Limit(lb):   Lifting(hrs):   Weight  Limit(lb):     Comments: Limited use of RUE. OTC nsaids TID for pain. RICE treatment as directed.  Transfer of care to ortho.    Repetitive Actions   Hands: i.e. Fine Manipulations from Grasping:     Feet: i.e. Operating Foot Controls:     Driving / Operate Machinery:     Health Care Provider’s Original or Electronic Signature  RAGHAVENDRA SauerABRYON. Health Care Provider’s Original or Electronic Signature    Efraín Deshpande MD         Clinic Name / Location: [address]  [place] [address] Clinic Phone Number: Dept: [phone]   Appointment Time: 2:40 Pm Visit Start Time: 4:31 PM   Check-In Time:  2:47 Pm Visit Discharge Time:  5:00 PM   Original-Treating Physician or Chiropractor    Page 2-Insurer/TPA    Page 3-Employer    Page 4-Employee

## 2022-04-17 ASSESSMENT — ENCOUNTER SYMPTOMS
SENSORY CHANGE: 0
FOCAL WEAKNESS: 0
TINGLING: 0

## 2022-04-17 NOTE — PROGRESS NOTES
Subjective     Emilie Lundy is a 51 y.o. female who presents with Work-Related Injury (Seen on 4/2/22 already.  Needs a C4 because she fell out of her van.)    Pt PMH, SocHx, SurgHx, FamHx, Drug allergies and medications reviewed with pt/EPIC.      Family history reviewed, it is not pertinent to this complaint.     Employer's Name:       Patient presents with:  Work-Related Injury: Seen on 4/2/22 already.  Needs a C4 because she fell out of her van.       The following text has been copied and pasted from visit 4/2/2022:   Patient states that she was stepping out of her van and missed the side step and fell forward on her right wrist.  Patient states that she immediately started having pain and swelling in the right wrist and came to urgent care right away.  Patient denies fever, chills, laceration, rash, numbness, tingling, burning, chest pain, shortness of breath, nausea, vomiting, abdominal pain, dizziness, headache, loss of range of motion, head or neck injury, loss of consciousness, or weakness.    PT was diagnosed at that time with distal radius fracture, referred to orthopedics.  Pt has been seen by orthopedics, they placed her in a cast.     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  Restocking supplies in van               Location of Injury:  Fracture, Lower Arm (R)            Patient presents with:  Work-Related Injury: Seen on 4/2/22 already.  Needs to file as WC and needs a C4 because she fell out of her van.          Review of Systems   Musculoskeletal: Joint pain: right wrist.   Neurological: Negative for tingling, sensory change and focal weakness.   All other systems reviewed and are negative.           RADIOLOGY RESULTS   DX-WRIST-COMPLETE 3+ RIGHT    Result Date: 4/14/2022  Three-view radiographs of the right wrist demonstrate interval loss of radial height.  Patient is now ulnar  "positive.  There is also been further loss of volar tilt.  Radial inclination is remained unchanged.  Fracture lines are still evident.     DX-WRIST-COMPLETE 3+ RIGHT    Result Date: 4/4/2022  2 view of the wrist was reviewed by myself.  There is a distal radius, impacted, intra-articular fracture without any significant angulation or displacement.    DX-WRIST-COMPLETE 3+ RIGHT    Result Date: 4/2/2022 4/2/2022 4:51 PM HISTORY/REASON FOR EXAM:  Pain/Deformity Following Trauma. TECHNIQUE/EXAM DESCRIPTION AND NUMBER OF VIEWS:  4 views of the RIGHT hand/wrist. COMPARISON: None FINDINGS: There is an impacted, mildly displaced intra-articular distal radius fracture. The distal ulna appears intact. The carpal bones appear intact.     Impacted intra-articular distal radius fracture.             Objective     /74   Pulse 82   Temp 37.2 °C (99 °F)   Resp 16   Ht 1.6 m (5' 3\")   Wt 78 kg (172 lb)   SpO2 97%   BMI 30.47 kg/m²      Physical Exam  Vitals and nursing note reviewed. Exam conducted with a chaperone present.         Pt exam: pt is in short forearm cast.  Fingers are normal in color, temperature without swelling. Distal neuro/vascular intact.  Cap refill < 2 sec                     Assessment & Plan        1. Closed fracture of right wrist with routine healing, subsequent encounter    - Referral to Orthopedics    2. Right wrist injury, subsequent encounter    - Referral to Orthopedics            Patient was evaluated in clinic today while wearing appropriate personal protective equipment.      Pt was seen 4/2/2022 and did not realize that she needed to file as work comp injury as she and her  own the company.      Pt here today to establish work comp claim.     Pt has already been seen by ortho, so transfer of care order also placed in chart.     Xray was not repeated today.  Xray results attached to this chart today were from visit on 4/2/2022.     "